# Patient Record
Sex: MALE | NOT HISPANIC OR LATINO | Employment: PART TIME | URBAN - METROPOLITAN AREA
[De-identification: names, ages, dates, MRNs, and addresses within clinical notes are randomized per-mention and may not be internally consistent; named-entity substitution may affect disease eponyms.]

---

## 2018-09-18 ENCOUNTER — OFFICE VISIT (OUTPATIENT)
Dept: URGENT CARE | Facility: URGENT CARE | Age: 33
End: 2018-09-18
Payer: COMMERCIAL

## 2018-09-18 ENCOUNTER — RADIANT APPOINTMENT (OUTPATIENT)
Dept: GENERAL RADIOLOGY | Facility: CLINIC | Age: 33
End: 2018-09-18
Attending: NURSE PRACTITIONER
Payer: COMMERCIAL

## 2018-09-18 VITALS
RESPIRATION RATE: 16 BRPM | TEMPERATURE: 98.3 F | SYSTOLIC BLOOD PRESSURE: 119 MMHG | WEIGHT: 242 LBS | HEART RATE: 88 BPM | DIASTOLIC BLOOD PRESSURE: 84 MMHG | OXYGEN SATURATION: 95 %

## 2018-09-18 DIAGNOSIS — J98.01 ACUTE BRONCHOSPASM: ICD-10-CM

## 2018-09-18 DIAGNOSIS — R05.3 CHRONIC COUGH: Primary | ICD-10-CM

## 2018-09-18 PROCEDURE — 99203 OFFICE O/P NEW LOW 30 MIN: CPT | Performed by: NURSE PRACTITIONER

## 2018-09-18 PROCEDURE — 71046 X-RAY EXAM CHEST 2 VIEWS: CPT | Mod: FY

## 2018-09-18 RX ORDER — CODEINE PHOSPHATE AND GUAIFENESIN 10; 100 MG/5ML; MG/5ML
1 SOLUTION ORAL EVERY 4 HOURS PRN
Qty: 200 ML | Refills: 0 | Status: SHIPPED | OUTPATIENT
Start: 2018-09-18 | End: 2018-09-23

## 2018-09-18 RX ORDER — AZITHROMYCIN 250 MG/1
TABLET, FILM COATED ORAL
COMMUNITY
Start: 2018-09-17 | End: 2019-05-01

## 2018-09-18 RX ORDER — ALBUTEROL SULFATE 90 UG/1
AEROSOL, METERED RESPIRATORY (INHALATION)
COMMUNITY
Start: 2018-09-17 | End: 2024-03-28

## 2018-09-18 RX ORDER — PREDNISONE 20 MG/1
20 TABLET ORAL 2 TIMES DAILY
Qty: 10 TABLET | Refills: 0 | Status: SHIPPED | OUTPATIENT
Start: 2018-09-18 | End: 2018-09-23

## 2018-09-18 ASSESSMENT — ENCOUNTER SYMPTOMS
SHORTNESS OF BREATH: 0
FEVER: 0
SORE THROAT: 0
DIAPHORESIS: 0
WHEEZING: 1
COUGH: 1
DIARRHEA: 0
RHINORRHEA: 0
VOMITING: 0
NAUSEA: 0
CHILLS: 0

## 2018-09-18 NOTE — PROGRESS NOTES
SUBJECTIVE:   Lew Hickman is a 32 year old male presenting with a chief complaint of   Chief Complaint   Patient presents with     Cough     Cough x3 weeks. on Zpack, cough syrup and inhaler.        He is a new patient of [facility].    URI Adult    Onset of symptoms was 3 week(s) ago.  Course of illness is worsening.    Severity moderate  Current and Associated symptoms: fever, cough - productive, wheezing and body aches  Treatment measures tried include azithromycin from [facility]. Did not use albuterol that ordered.   Predisposing factors include None.        Review of Systems   Constitutional: Negative for chills, diaphoresis and fever.   HENT: Positive for congestion. Negative for ear pain, rhinorrhea and sore throat.    Respiratory: Positive for cough and wheezing. Negative for shortness of breath.    Gastrointestinal: Negative for diarrhea, nausea and vomiting.   All other systems reviewed and are negative.      No past medical history on file.  No family history on file.  Current Outpatient Prescriptions   Medication Sig Dispense Refill     azithromycin (ZITHROMAX) 250 MG tablet        guaiFENesin-codeine (ROBITUSSIN AC) 100-10 MG/5ML SOLN solution Take 5 mLs by mouth every 4 hours as needed 200 mL 0     predniSONE (DELTASONE) 20 MG tablet Take 1 tablet (20 mg) by mouth 2 times daily for 5 days 10 tablet 0     VENTOLIN  (90 Base) MCG/ACT inhaler        Social History   Substance Use Topics     Smoking status: Former Smoker     Smokeless tobacco: Never Used     Alcohol use Not on file       OBJECTIVE  /84  Pulse 88  Temp 98.3  F (36.8  C) (Oral)  Resp 16  Wt 242 lb (109.8 kg)  SpO2 95%    Physical Exam   Cardiovascular: S1 normal, S2 normal and normal heart sounds.    Pulmonary/Chest: Effort normal. He has wheezes (expiratory).   Neurological: He is alert.   Psychiatric: He has a normal mood and affect. His behavior is normal.       ASSESSMENT:      ICD-10-CM    1. Chronic cough R05 XR  Chest 2 Views     predniSONE (DELTASONE) 20 MG tablet     guaiFENesin-codeine (ROBITUSSIN AC) 100-10 MG/5ML SOLN solution   2. Acute bronchospasm J98.01       PLAN:  Advised to continue the antibiotic  Albuterol every 4 hours for the next 48 hours, then as needed.  I discussed xray results with the patient.  Patient educational/instructional material provided including reasons for follow-up    The patient indicates understanding of these issues and agrees with the plan.            Patient Instructions     Chronic Cough with Uncertain Cause (Adult)    Everyone has had a cough as part of the common cold, flu, or bronchitis. This kind of cough occurs along with an achy feeling, low-grade fever, nasal and sinus congestion, and a scratchy or sore throat. This usually gets better in 2 to 3 weeks. A cough that lasts longer than 3 weeks may be due to other causes.  If your cough does not improve over the next 2 weeks, further testing may be needed. Follow up with your healthcare provider as advised. Cough suppressants may be recommended. Based on your exam today, the exact cause of your cough is not certain. Below are some common causes for persistent cough.  Smoker's cough  Smoker s cough doesn t go away. If you continue to smoke, it only gets worse. The cough is from irritation in the air passages. Talk to your healthcare provider about quitting. Medicines or nicotine-replacement products, like gum or the patch, may make quitting easier.  Postnasal drip  A cough that is worse at night may be due to postnasal drip. Excess mucus in the nose drains from the back of your nose to your throat. This triggers the cough reflex. Postnasal drip may be due to a sinus infection or allergy. Common allergens include dust, tobacco smoke (both inhaled and secondhand smoke), environmental pollutants, pollen, mold, pets, cleaning agents, room deodorizers, and chemical fumes. Over-the-counter antihistamines or decongestants may be helpful  for allergies. A sinus infection may requires antibiotic treatment. See your healthcare provider if symptoms continue.  Medicines  Certain prescribed medicines can cause a chronic cough in some people:    ACE inhibitors for high blood pressure. These include benazepril, captopril, enalapril, fosinopril, lisinopril, quinapril, ramipril, and others.    Beta-blockers for high blood pressure and other conditions. These include propranolol, atenolol, metoprolol, nadolol, and others.  Let your healthcare provider know if you are taking any of these.  Asthma  Cough may be the only sign of mild asthma. You may have tests to find out if asthma is causing your cough. You may also take asthma medicine on a trial basis.  Acid reflux (heartburn, GERD)  The esophagus is the tube that carries food from the mouth to the stomach. A valve at its lower end prevents stomach acids from flowing upward. If this valve does not work properly, acid from the stomach enters the esophagus. This may cause a burning pain in the upper abdomen or lower chest, belching, or cough. Symptoms are often worse when lying flat. Avoid eating or drinking before bedtime. Try using extra pillows to raise your upper body, or place 4-inch blocks under the head of your bed. You may try an over-the-counter antacid or an acid-blocking medicine such as famotidine, cimetidine, ranitidine, esomeprazole, lansoprazole, or omeprazole. Stronger medicines for this condition can be prescribed by your healthcare provider.  Follow-up care  Follow up with your healthcare provider, or as advised, if your cough does not improve. Further testing may be needed.  Note: If an X-ray was taken, a specialist will review it. You will be notified of any new findings that may affect your care.  When to seek medical advice  Call your healthcare provider right away if any of these occur:    Mild wheezing or difficulty breathing    Fever of 100.4 F (38 C) or higher, or as directed by your  healthcare provider    Unexpected weight loss    Coughing up large amounts of colored sputum    Night sweats (sheets and pajamas get soaking wet)  Call 911  Call 911 if any of these occur:    Coughing up blood    Moderate to severe trouble breathing or wheezing  Date Last Reviewed: 9/13/2015 2000-2017 [employer]. [address] [place] [address], [place] [address]. All rights reserved. This information is not intended as a substitute for professional medical care. Always follow your healthcare professional's instructions.

## 2018-09-18 NOTE — MR AVS SNAPSHOT
After Visit Summary   9/18/2018    Lew Hickman    MRN: [id number]           Patient Information     Date Of Birth          1985        Visit Information        Provider Department      9/18/2018 3:50 PM Meka Bach NP [facility]        Today's Diagnoses     Chronic cough    -  1      Care Instructions      Chronic Cough with Uncertain Cause (Adult)    Everyone has had a cough as part of the common cold, flu, or bronchitis. This kind of cough occurs along with an achy feeling, low-grade fever, nasal and sinus congestion, and a scratchy or sore throat. This usually gets better in 2 to 3 weeks. A cough that lasts longer than 3 weeks may be due to other causes.  If your cough does not improve over the next 2 weeks, further testing may be needed. Follow up with your healthcare provider as advised. Cough suppressants may be recommended. Based on your exam today, the exact cause of your cough is not certain. Below are some common causes for persistent cough.  Smoker's cough  Smoker s cough doesn t go away. If you continue to smoke, it only gets worse. The cough is from irritation in the air passages. Talk to your healthcare provider about quitting. Medicines or nicotine-replacement products, like gum or the patch, may make quitting easier.  Postnasal drip  A cough that is worse at night may be due to postnasal drip. Excess mucus in the nose drains from the back of your nose to your throat. This triggers the cough reflex. Postnasal drip may be due to a sinus infection or allergy. Common allergens include dust, tobacco smoke (both inhaled and secondhand smoke), environmental pollutants, pollen, mold, pets, cleaning agents, room deodorizers, and chemical fumes. Over-the-counter antihistamines or decongestants may be helpful for allergies. A sinus infection may requires antibiotic treatment. See your healthcare provider if symptoms continue.  Medicines  Certain prescribed medicines can  cause a chronic cough in some people:    ACE inhibitors for high blood pressure. These include benazepril, captopril, enalapril, fosinopril, lisinopril, quinapril, ramipril, and others.    Beta-blockers for high blood pressure and other conditions. These include propranolol, atenolol, metoprolol, nadolol, and others.  Let your healthcare provider know if you are taking any of these.  Asthma  Cough may be the only sign of mild asthma. You may have tests to find out if asthma is causing your cough. You may also take asthma medicine on a trial basis.  Acid reflux (heartburn, GERD)  The esophagus is the tube that carries food from the mouth to the stomach. A valve at its lower end prevents stomach acids from flowing upward. If this valve does not work properly, acid from the stomach enters the esophagus. This may cause a burning pain in the upper abdomen or lower chest, belching, or cough. Symptoms are often worse when lying flat. Avoid eating or drinking before bedtime. Try using extra pillows to raise your upper body, or place 4-inch blocks under the head of your bed. You may try an over-the-counter antacid or an acid-blocking medicine such as famotidine, cimetidine, ranitidine, esomeprazole, lansoprazole, or omeprazole. Stronger medicines for this condition can be prescribed by your healthcare provider.  Follow-up care  Follow up with your healthcare provider, or as advised, if your cough does not improve. Further testing may be needed.  Note: If an X-ray was taken, a specialist will review it. You will be notified of any new findings that may affect your care.  When to seek medical advice  Call your healthcare provider right away if any of these occur:    Mild wheezing or difficulty breathing    Fever of 100.4 F (38 C) or higher, or as directed by your healthcare provider    Unexpected weight loss    Coughing up large amounts of colored sputum    Night sweats (sheets and pajamas get soaking wet)  Call 911  Call 911  "if any of these occur:    Coughing up blood    Moderate to severe trouble breathing or wheezing  Date Last Reviewed: 2015-2017 [employer]. [address] [place] [address], [place] [address]. All rights reserved. This information is not intended as a substitute for professional medical care. Always follow your healthcare professional's instructions.                Follow-ups after your visit        Who to contact     If you have questions or need follow up information about today's clinic visit or your schedule please contact [facility] directly at [phone].  Normal or non-critical lab and imaging results will be communicated to you by USB [employer], letter or phone within 4 business days after the clinic has received the results. If you do not hear from us within 7 days, please contact the clinic through [employer] or phone. If you have a critical or abnormal lab result, we will notify you by phone as soon as possible.  Submit refill requests through [employer] or call your pharmacy and they will forward the refill request to us. Please allow 3 business days for your refill to be completed.          Additional Information About Your Visit        MyChart Information     [employer] lets you send messages to your doctor, view your test results, renew your prescriptions, schedule appointments and more. To sign up, go to [email or web address] . Click on \"Log in\" on the left side of the screen, which will take you to the Welcome page. Then click on \"Sign up Now\" on the right side of the page.     You will be asked to enter the access code listed below, as well as some personal information. Please follow the directions to create your username and password.     Your access code is: [email or web address]  Expires: 2018  4:29 PM     Your access code will  in 90 days. If you need help or a new code, please call your [facility] or [phone].        Care EveryWhere ID     This is your " Care EveryWhere ID. This could be used by other organizations to access your [place] medical records  [id number]        Your Vitals Were     Pulse Temperature Respirations Pulse Oximetry          88 98.3  F (36.8  C) (Oral) 16 95%         Blood Pressure from Last 3 Encounters:   09/18/18 119/84    Weight from Last 3 Encounters:   09/18/18 242 lb (109.8 kg)              We Performed the Following     XR Chest 2 Views          Today's Medication Changes          These changes are accurate as of 9/18/18  4:29 PM.  If you have any questions, ask your nurse or doctor.               Start taking these medicines.        Dose/Directions    predniSONE 20 MG tablet   Commonly known as:  DELTASONE   Used for:  Chronic cough   Started by:  Meka Bach NP        Dose:  20 mg   Take 1 tablet (20 mg) by mouth 2 times daily for 5 days   Quantity:  10 tablet   Refills:  0            Where to get your medicines      These medications were sent to [facility] [id number] [place] [address] - [address] AT [facility] [address], [facility] [address]     Phone:  [phone]     predniSONE 20 MG tablet                Primary Care Provider Office Phone # Fax #    [facility] [phone]       [address] [facility] [address]        Equal Access to Services     ALICIA ANDERSON AH: Hadii aad ku hadasho Soomaali, waaxda luqadaha, qaybta kaalmada adeegyada, shane cyr. So [facility] [phone].    ATENCIÓN: Si habla español, tiene a lockhart disposición servicios gratuitos de asistencia lingüística. Elena al [phone].    We comply with applicable federal civil rights laws and [place] laws. We do not discriminate on the basis of race, color, national origin, age, disability, sex, sexual orientation, or gender identity.            Thank you!     Thank you for choosing [facility]  for your care. Our goal is always to  provide you with excellent care. Hearing back from our patients is one way we can continue to improve our services. Please take a few minutes to complete the written survey that you may receive in the mail after your visit with us. Thank you!             Your Updated Medication List - Protect others around you: Learn how to safely use, store and throw away your medicines at www.disposemymeds.org.          This list is accurate as of 9/18/18  4:29 PM.  Always use your most recent med list.                   Brand Name Dispense Instructions for use Diagnosis    azithromycin 250 MG tablet    ZITHROMAX          predniSONE 20 MG tablet    DELTASONE    10 tablet    Take 1 tablet (20 mg) by mouth 2 times daily for 5 days    Chronic cough       VENTOLIN  (90 Base) MCG/ACT inhaler   Generic drug:  albuterol

## 2019-05-01 ENCOUNTER — OFFICE VISIT (OUTPATIENT)
Dept: FAMILY MEDICINE | Facility: CLINIC | Age: 34
End: 2019-05-01
Payer: COMMERCIAL

## 2019-05-01 VITALS
DIASTOLIC BLOOD PRESSURE: 89 MMHG | HEIGHT: 66 IN | BODY MASS INDEX: 40.5 KG/M2 | SYSTOLIC BLOOD PRESSURE: 144 MMHG | OXYGEN SATURATION: 97 % | WEIGHT: 252 LBS | HEART RATE: 75 BPM | TEMPERATURE: 98 F

## 2019-05-01 DIAGNOSIS — E66.01 MORBID OBESITY (H): ICD-10-CM

## 2019-05-01 DIAGNOSIS — R07.9 CHEST PAIN, UNSPECIFIED TYPE: Primary | ICD-10-CM

## 2019-05-01 DIAGNOSIS — R03.0 ELEVATED BLOOD PRESSURE READING WITHOUT DIAGNOSIS OF HYPERTENSION: ICD-10-CM

## 2019-05-01 PROCEDURE — 99214 OFFICE O/P EST MOD 30 MIN: CPT | Performed by: NURSE PRACTITIONER

## 2019-05-01 PROCEDURE — 93000 ELECTROCARDIOGRAM COMPLETE: CPT | Performed by: NURSE PRACTITIONER

## 2019-05-01 ASSESSMENT — PAIN SCALES - GENERAL: PAINLEVEL: SEVERE PAIN (7)

## 2019-05-01 ASSESSMENT — MIFFLIN-ST. JEOR: SCORE: 2028.06

## 2019-05-01 NOTE — PROGRESS NOTES
SUBJECTIVE:   Lew Hickman is a 33 year old male who presents to clinic today for the following   health issues:        CHEST PAIN     Onset: 2 weeks ago    Description:   Location:  left side  Character: intermittent  Radiation: none  Duration: 5-10 minutes     Intensity: mild    Progression of Symptoms:  same    Accompanying Signs & Symptoms:  Shortness of breath: YES, preexisting  Sweating: YES  Nausea/vomiting: no   Lightheadedness: YES  Palpitations: YES  Fever/Chills: no   Cough: YES  Heartburn: YES- sometimes    History:   Family history of heart disease no   Tobacco use: no     Precipitating factors:   Worse with exertion: no   Worse with deep breaths :  YES  Related to food: not sure    Alleviating factors:  None       Therapies Tried and outcome: None        Has heartburn feeling as well as a little sting in left chest  Had cold sweat this morning and stinging feeling.   Sometimes has shortness of breath - worse with walking. Taking deep breath makes it get better  Pain doesn't radiate        Additional history: as documented    Reviewed  and updated as needed this visit by clinical staff  Tobacco  Allergies  Meds  Problems  Med Hx  Surg Hx  Fam Hx  Soc Hx          Reviewed and updated as needed this visit by Provider  Tobacco  Allergies  Meds  Problems  Med Hx  Surg Hx  Fam Hx         Patient Active Problem List   Diagnosis     Morbid obesity (H)     History reviewed. No pertinent surgical history.    Social History     Tobacco Use     Smoking status: Former Smoker     Smokeless tobacco: Never Used   Substance Use Topics     Alcohol use: Not on file     History reviewed. No pertinent family history.      Current Outpatient Medications   Medication Sig Dispense Refill     VENTOLIN  (90 Base) MCG/ACT inhaler        No Known Allergies  No lab results found.   BP Readings from Last 3 Encounters:   05/01/19 144/89   09/18/18 119/84    Wt Readings from Last 3 Encounters:   05/01/19 114.3 kg  "(252 lb)   09/18/18 109.8 kg (242 lb)                  Labs reviewed in EPIC    ROS:  Constitutional, HEENT, cardiovascular, pulmonary, gi and gu systems are negative, except as otherwise noted.    OBJECTIVE:     /89 (BP Location: Right arm, Patient Position: Chair, Cuff Size: Adult Large)   Pulse 75   Temp 98  F (36.7  C) (Oral)   Ht 1.68 m (5' 6.14\")   Wt 114.3 kg (252 lb)   SpO2 97%   BMI 40.50 kg/m    Body mass index is 40.5 kg/m .  GENERAL: healthy, alert and no distress  RESP: lungs clear to auscultation - no rales, rhonchi or wheezes  CV: regular rate and rhythm, normal S1 S2, no S3 or S4, no murmur, click or rub, no peripheral edema and peripheral pulses strong  MS: no gross musculoskeletal defects noted, no edema  PSYCH: mentation appears normal, affect normal/bright    Diagnostic Test Results:  Results for orders placed or performed in visit on 05/01/19 (from the past 24 hour(s))   EKG 12-lead complete w/read - Clinics    Narrative    Sinus rhythm  Rate 67      QTcH 408       ASSESSMENT/PLAN:         ICD-10-CM    1. Chest pain, unspecified type R07.9 EKG 12-lead complete w/read - Clinics   2. Morbid obesity (H) E66.01    3. Elevated blood pressure reading without diagnosis of hypertension R03.0      Recommend evaluation in emergency department now. Declines EMS transport.     CHRISS Murillo [facility]      "

## 2019-05-01 NOTE — PATIENT INSTRUCTIONS
At [facility], we strive to deliver an exceptional experience to you, every time we see you.  If you receive a survey in the mail, please send us back your thoughts. We really do value your feedback.    Based on your medical history, these are the current health maintenance/preventive care services that you are due for (some may have been done at this visit.)  Health Maintenance Due   Topic Date Due     PREVENTIVE CARE VISIT  1985     HIV SCREEN (SYSTEM ASSIGNED)  09/21/2002     DTAP/TDAP/TD IMMUNIZATION (1 - Tdap) 09/21/2009     PHQ-2  01/01/2019         Suggested websites for health information:  Www.[place].org : Up to date and easily searchable information on multiple topics.  Www.medlineplus.gov : medication info, interactive tutorials, watch real surgeries online  Www.familydoctor.org : good info from the Academy of Family Physicians  Www.cdc.gov : public health info, travel advisories, epidemics (H1N1)  Www.aap.org : children's health info, normal development, vaccinations  Www.health.[facility].[place].us : [place] dept of health, public health issues in [place], N1N1    Your care team:                            Family Medicine Internal Medicine   MD Clark Vidal MD Shantel Branch-Fleming, MD Katya Georgiev PA-C Nam Ho, MD Pediatrics   JHON Simpson, RUBIA Lackey APRMD Vivian Ring CNP, MD Deborah Mielke, MD Kim Thein, APRN [facility]      Clinic hours: Monday - Thursday 7 am-7 pm; Fridays 7 am-5 pm.   Urgent care: Monday - Friday 11 am-9 pm; Saturday and Sunday 9 am-5 pm.  Pharmacy : Monday -Thursday 8 am-8 pm; Friday 8 am-6 pm; Saturday and Sunday 9 am-5 pm.     Clinic: [phone]   Pharmacy: [phone]

## 2022-10-14 ENCOUNTER — HOSPITAL ENCOUNTER (EMERGENCY)
Facility: HOSPITAL | Age: 37
Discharge: HOME | End: 2022-10-14
Admitting: STUDENT IN AN ORGANIZED HEALTH CARE EDUCATION/TRAINING PROGRAM
Payer: COMMERCIAL

## 2022-10-14 VITALS
OXYGEN SATURATION: 96 % | SYSTOLIC BLOOD PRESSURE: 144 MMHG | WEIGHT: 250 LBS | HEART RATE: 69 BPM | RESPIRATION RATE: 18 BRPM | BODY MASS INDEX: 40.18 KG/M2 | HEIGHT: 66 IN | DIASTOLIC BLOOD PRESSURE: 85 MMHG | TEMPERATURE: 97.8 F

## 2022-10-14 DIAGNOSIS — S05.01XA CONJUNCTIVAL ABRASION, RIGHT, INITIAL ENCOUNTER: ICD-10-CM

## 2022-10-14 DIAGNOSIS — H57.11 ACUTE RIGHT EYE PAIN: ICD-10-CM

## 2022-10-14 PROCEDURE — 99283 EMERGENCY DEPT VISIT LOW MDM: CPT

## 2022-10-14 PROCEDURE — 250N000009 HC RX 250: Performed by: EMERGENCY MEDICINE

## 2022-10-14 RX ORDER — TETRACAINE HYDROCHLORIDE 5 MG/ML
1 SOLUTION OPHTHALMIC ONCE
Status: COMPLETED | OUTPATIENT
Start: 2022-10-14 | End: 2022-10-14

## 2022-10-14 RX ORDER — ERYTHROMYCIN 5 MG/G
0.5 OINTMENT OPHTHALMIC 4 TIMES DAILY
Qty: 10 G | Refills: 0 | Status: SHIPPED | OUTPATIENT
Start: 2022-10-14 | End: 2022-10-19

## 2022-10-14 RX ADMIN — TETRACAINE HYDROCHLORIDE 1 DROP: 5 SOLUTION OPHTHALMIC at 15:26

## 2022-10-14 RX ADMIN — FLUORESCEIN SODIUM 1 STRIP: 1 STRIP OPHTHALMIC at 15:26

## 2022-10-14 ASSESSMENT — ENCOUNTER SYMPTOMS
EYE PAIN: 1
EYE REDNESS: 1
NAUSEA: 0
HEADACHES: 0
VOMITING: 0

## 2022-10-14 ASSESSMENT — VISUAL ACUITY
OD: 20/20
OS: 20/15

## 2022-10-14 NOTE — Clinical Note
Lew Hickman was seen and treated in our emergency department on 10/14/2022.  He may return to work on 10/17/2022.       If you have any questions or concerns, please don't hesitate to call.      Anabel Pugh PA-C

## 2022-10-14 NOTE — ED NOTES
ED Provider In Triage Note  [facility]  Encounter Date: Oct 14, 2022    No chief complaint on file.      Brief HPI:   Lew Hickman is a 37 year old male presenting to the Emergency Department with a chief complaint of right eye injury that occurred at work today. A plastic box strap hit him in the eye. Occasional blurring, but no vision changes otherwise. Has glasses, but does not wear them; no contact lenses.     Brief Physical Exam  There were no vitals taken for this visit.  General: Non-toxic appearing  HEENT: Atraumatic  EYES: right eye with conjunctival injection (especially medially); PERRL; EOMI  Resp: No respiratory distress  Abdomen: Non-peritoneal  Neuro: Alert, oriented, answers questions appropriately  Psych: Behavior appropriate      Plan Initiated in Triage:  Visual acuity    Will need slit lamp exam, fluorescein staining      PIT Dispo:   Return to lobby while awaiting workup and ED bed availability    Clara Francois MD on 10/14/2022 at 12:44 PM    Patient was evaluated by the Physician in Triage due to a limitation of available rooms in the Emergency Department. A plan of care was discussed based on the information obtained on the initial evaluation and patient was consuled to return back to the Emergency Department lobby after this initial evalutaiton until results were obtained or a room became available in the Emergency Department. Patient was counseled not to leave prior to receiving the results of their workup.     Clara Francois MD  [facility] EMERGENCY DEPARTMENT  [address] [place] [id number]  [phone]       Clara Francois MD  10/14/22 1246

## 2022-10-14 NOTE — ED PROVIDER NOTES
EMERGENCY DEPARTMENT ENCOUNTER      NAME: Lew Hickman  AGE: 37 year old male  YOB: 1985  MRN: [id number]  EVALUATION DATE & TIME: No admission date for patient encounter.    PCP: [facility]    ED PROVIDER: Anabel Pugh PA-C      Chief Complaint   Patient presents with     Eye Injury       FINAL IMPRESSION:  1. Acute right eye pain    2. Conjunctival abrasion, right, initial encounter        MEDICAL DECISION MAKING:    Pertinent Labs & Imaging studies reviewed. (See chart for details)  Lew Hickman is a 37 year old male who presents for evaluation of right eye pain following an injury at work.  Patient reports about 1 hour prior to evaluation, was opening packages when a part of the plastic wrapping hit him in the right eye.  He developed pain and tearing following this immediately.  Patient does not wear contacts.  Patient declines any decreased vision..     On my initial evaluation, vital signs normal. On physical exam patient is awake, alert, slightly uncomfortable appearing, no acute distress.  His right eye is slightly injected with some watery discharge.  No obvious foreign body appreciated on initial inspection.  No obvious deformity or swelling.  No skin changes or laceration surrounding right eye.  Left eye normal.  Visual acuity normal.    On inspection of right eye with slit lamp examination after fluorescein staining.  He does have one small epithelial defect with fluorescein uptake to the  conjunctiva nasally at the 3 o'clock position.  No corneal abrasion or injury noted.  No obvious foreign body.    Differential diagnosis includes corneal abrasion, corneal ulcer, conjunctival injury, foreign body, conjunctivitis. Emergency department workup included slit-lamp examination with tetracaine drops and fluorescein staining.     Based on slit-lamp examination as detailed above, suspect this is a epithelial defect in the conjunctiva.  No obvious  foreign body appreciated.  No corneal injury.  Defect is very small, but will treat with erythromycin ointment 4 times a day for 5 days and did provide patient with [place] [facility] referral so that he can be seen by an ophthalmologist emergently either this afternoon or tomorrow for further investigation of eye injury.  Reassuring that patient visual acuity normal.  Provided strict return precautions to the emergency department if vision changes or if pain does not improve or gets worse prior to ophthalmology visit.    Patient was discharged in stable condition with treatment plan as below. Instructed to follow up with ophthalmology in 1 day and with primary care provider in 3 days and return to the emergency department with any new or worsening of symptoms. Patient expressed understanding, feels comfortable, and is in agreement with this plan. All questions addressed prior to discharge.    Medical Decision Making    Supplemental history from: N/A    External Record(s) Reviewed: N/A    Differential Diagnosis: See MDM charting for differential considered.     I performed an independent interpretation of the: N/A    Discussed with radiology regarding test interpretation: N/A    Discussion of management with another provider: N/A    The following testing was considered but ultimately not selected: None     I considered prescription management with: Antibiotic    The patient's care impacted: None    Consideration of Admission/Observation: N/A - Patient admitted or discharged without consideration for admission    Care significantly affected by Social Determinants of Health including: N/A    ED COURSE:  2:14 PM  I reviewed the patient's chart. I met with the patient to gather history and to perform my initial exam.    I wore appropriate PPE during this encounter including: facemask & eye protection   2:45 PM I rechecked the patient and updated  on results. We discussed plan for discharge including treatment plan,  "follow-up and return precautions to emergency department.  Patient voiced understanding and in agreement with this plan.    At the conclusion of the encounter I discussed the results of all of the tests and the disposition. The questions were answered. The patient or family acknowledged understanding and was agreeable with the care plan.     MEDICATIONS GIVEN IN THE EMERGENCY:  Medications   tetracaine (PONTOCAINE) 0.5 % ophthalmic solution 1 drop (1 drop Right Eye Given by Other 10/14/22 1526)   fluorescein (FUL-CALVIN) ophthalmic strip 1 strip (1 strip Right Eye Given by Other 10/14/22 1526)       NEW PRESCRIPTIONS STARTED AT TODAY'S ER VISIT  Discharge Medication List as of 10/14/2022  3:15 PM      START taking these medications    Details   erythromycin (ROMYCIN) 5 MG/GM ophthalmic ointment Place 0.5 inches into the right eye 4 times daily for 5 daysDisp-10 g, R-0Local Print                  =================================================================    HPI:    Patient information was obtained from: Patient    Use of Interpretor: N/A       Lew Vang Her is a 37 year old male with a pertinent history of morbid obesity who presents to this ED by walk in for evaluation of eye injury.    Patient reports that he was picking up \"plastic box scraps\" when one of the scraps whipped up and hit him in the right eye. Patient complains of a stinging pain and redness in the right eye. Patient denies wearing contact lenses, vision changes, headache, nausea, vomiting, or any other concerns at this time.    REVIEW OF SYSTEMS:  Review of Systems   Eyes: Positive for pain and redness. Negative for visual disturbance.   Gastrointestinal: Negative for nausea and vomiting.   Neurological: Negative for headaches.   All other systems reviewed and are negative.      PAST MEDICAL HISTORY:  No past medical history on file.    PAST SURGICAL HISTORY:  No past surgical history on file.    CURRENT MEDICATIONS:    No current " "facility-administered medications for this encounter.    Current Outpatient Medications:      erythromycin (ROMYCIN) 5 MG/GM ophthalmic ointment, Place 0.5 inches into the right eye 4 times daily for 5 days, Disp: 10 g, Rfl: 0     VENTOLIN  (90 Base) MCG/ACT inhaler, , Disp: , Rfl:     ALLERGIES:  No Known Allergies    FAMILY HISTORY:  No family history on file.    SOCIAL HISTORY:   Social History     Socioeconomic History     Marital status: Single   Tobacco Use     Smoking status: Former     Smokeless tobacco: Never       VITALS:  Patient Vitals for the past 24 hrs:   BP Temp Temp src Pulse Resp SpO2 Height Weight   10/14/22 1536 (!) 144/85 -- -- 69 18 96 % -- --   10/14/22 1245 (!) 142/94 97.8  F (36.6  C) Temporal 72 16 96 % 1.676 m (5' 6\") 113.4 kg (250 lb)       PHYSICAL EXAM   Constitutional: Well developed, Well nourished, NAD  HENT: Normocephalic, Atraumatic, Bilateral external ears normal, Oropharynx normal, mucous membranes moist, Nose normal.   Neck: Normal range of motion, No tenderness, Supple, No stridor.  Eyes: PERRL, EOMI, slightly injected conjunctiva with some watery discharge.  No obvious foreign body appreciated on initial inspection.  No obvious deformity or swelling.  No skin changes or laceration surrounding right eye.  Left eye normal.  Visual acuity normal.  Respiratory: Normal breath sounds, No respiratory distress, No wheezing, Speaks full sentences easily. No cough.  Cardiovascular: Normal heart rate, Regular rhythm, No murmurs, No rubs, No gallops. Chest wall nontender.  GI: Soft, No tenderness, No masses, No flank tenderness. No rebound or guarding.  Musculoskeletal: 2+ DP pulses. No edema. No cyanosis, No clubbing. Good range of motion in all major joints. No tenderness to palpation or major deformities noted. No tenderness of the CTLS spine.   Integument: Warm, Dry, No erythema, No rash. No petechiae.  Neurologic: Alert & oriented x 3, Normal motor function, Normal sensory " function, No focal deficits noted. Normal gait.  Psychiatric: Affect normal, Judgment normal, Mood normal. Cooperative.  Slit lamp exam: slit lamp examination after fluorescein staining.  He does have some epithelial defects with fluorescein uptake to the  conjunctiva nasally at the 3 o'clock position.  No corneal abrasion or injury noted.  No obvious foreign body.      LAB:  All pertinent labs reviewed and interpreted.  Labs Ordered and Resulted from Time of ED Arrival to Time of ED Departure - No data to display    RADIOLOGY:  Reviewed all pertinent imaging. Please see official radiology report.  No orders to display       PROCEDURES:   PROCEDURE: SLIT LAMP   INDICATIONS: Right eye pain   PROCEDURE PROVIDER: Anabel Pugh PA-C   SITE: Right eye   MEDICATION: tetracaine was applied by this provider to right eye.     NOTE:  right eye exam:  One small epithelial defect with fluorescein uptake to the  conjunctiva nasally at the 3 o'clock position.  No corneal abrasion or injury noted.  No obvious foreign body.     COMPLICATIONS:  None     Diagnosis:  1. Acute right eye pain    2. Conjunctival abrasion, right, initial encounter            IKwabena, am serving as a scribe to document services personally performed by Anabel Pugh PA-C based on my observation and the provider's statements to me. I, Anabel Pugh PA-C attest that Kwabena Jose is acting in a scribe capacity, has observed my performance of the services and has documented them in accordance with my direction.    Anabel Pugh PA-C  Emergency Medicine  [facility]  10/14/2022       Anabel Pugh PA-C  10/14/22 1752

## 2022-10-14 NOTE — ED TRIAGE NOTES
Patient presents to ED with Right Eye injury that occurred at work with 45 minutes ago.  He states that a piece of plastic wrap flew up and hit his right eye.  Note redness in conjunctiva, slight blurry vision.  Wears contacts, but not today.     Triage Assessment     Row Name 10/14/22 1246       Triage Assessment (Adult)    Airway WDL WDL       Respiratory WDL    Respiratory WDL WDL       Skin Circulation/Temperature WDL    Skin Circulation/Temperature WDL WDL       Cardiac WDL    Cardiac WDL WDL       Peripheral/Neurovascular WDL    Peripheral Neurovascular WDL WDL       Cognitive/Neuro/Behavioral WDL    Cognitive/Neuro/Behavioral WDL WDL

## 2022-10-14 NOTE — DISCHARGE INSTRUCTIONS
Please bring this paperwork with you to your follow-up appointment.    You were seen in the urgent care/emergency department for right eye pain after an injury.     You had an exam done today with fluroscein staining that showed a superficial cut on the conjunctiva (white part) of your eye.     This should heal over the next few days with the antibiotic ointment.     Please use erythromycin ointment to the eye 4 x a day for 5 days.     Please see an ophthalmologist tomorrow morning    For your symptoms:    Tylenol/ibuprofen as needed  You may take up to 650 mg of Tylenol (acetaminophen) up to 4 times daily and up to 600 mg of ibuprofen up to 4 times daily as needed for fever, pain.  Please do not take more than the daily maximum recommended dose (tylenol = 4 grams, ibuprofen = 2.4 grams) as it can cause harm to your liver, kidneys, stomach.  It is best to take ibuprofen with food. Please read labels of any over-the-counter medicine you may be taking as it may contain Tylenol (acetaminophen) or Advil (ibuprofen).     Please follow up with ophthalmology tomorrow morning at the address listed above.     Follow up with your primary care provider for recheck in 3 days for ER follow up.     Return to the emergency department if you develop worsening eye pain, decreased vision, blurry vision, fevers, chills , or any other new worsening or concerning symptoms. We'd be happy to see you again.    Thank you for allowing us to be part of your care today.    Take care!  -Anabel Pugh PA-C

## 2024-03-27 ENCOUNTER — NURSE TRIAGE (OUTPATIENT)
Dept: FAMILY MEDICINE | Facility: CLINIC | Age: 39
End: 2024-03-27
Payer: COMMERCIAL

## 2024-03-27 NOTE — TELEPHONE ENCOUNTER
Patient calling in with 3 weeks of productive cough (green/yellow sputum), mild SOB with exertion (not at rest) and wheezing when laying down. Also experiencing runny nose, mild HA, sinus pain, sore throat.     Patient requesting appt to be seen.     Patient states he has not taken at home COVID test, has been around family who had similar symptoms, specifically mom who he thinks ended up being dx with PNA.     Recommended patient head to nearest  now to be assessed, another option for care would be nearest ED if needed (if SOB worsens or starts having CP). Also recommended that patient call office back after being seen to establish care if he wants [facility] to be PCP clinic. Patient verbalized understanding, agrees with plan.      TOMAS Soares, RN  [facility]    Reason for Disposition   MILD difficulty breathing (e.g., minimal/no SOB at rest, SOB with walking, pulse <100) and still present when not coughing    Additional Information   Negative: Bluish (or gray) lips or face   Negative: SEVERE difficulty breathing (e.g., struggling for each breath, speaks in single words)   Negative: Rapid onset of cough and has hives   Negative: Coughing started suddenly after medicine, an allergic food or bee sting   Negative: Difficulty breathing after exposure to flames, smoke, or fumes   Negative: Sounds like a life-threatening emergency to the triager   Negative: Previous asthma attacks and this feels like asthma attack   Negative: Dry cough (non-productive; no sputum or minimal clear sputum) and within 14 days of COVID-19 Exposure   Negative: MODERATE difficulty breathing (e.g., speaks in phrases, SOB even at rest, pulse 100-120) and still present when not coughing   Negative: Chest pain present when not coughing   Negative: Passed out (i.e., fainted, collapsed and was not responding)   Negative: Patient sounds very sick or weak to the triager    Answer Assessment - Initial Assessment  "Questions  1. ONSET: \"When did the cough begin?\"       3 weeks  2. SEVERITY: \"How bad is the cough today?\"       Mild/moderate (every 15-30 minutes), worse with laying down  3. SPUTUM: \"Describe the color of your sputum\" (none, dry cough; clear, white, yellow, green)      Sputum - green/yellow  4. HEMOPTYSIS: \"Are you coughing up any blood?\" If so ask: \"How much?\" (flecks, streaks, tablespoons, etc.)      Sometimes a \"little pink sometimes\" - not sure if this was due to a medication he was taking (dayquil/nyquil), last time he noticed the pink was 2 days ago  5. DIFFICULTY BREATHING: \"Are you having difficulty breathing?\" If Yes, ask: \"How bad is it?\" (e.g., mild, moderate, severe)     - MILD: No SOB at rest, mild SOB with walking, speaks normally in sentences, can lie down, no retractions, pulse < 100.     - MODERATE: SOB at rest, SOB with minimal exertion and prefers to sit, cannot lie down flat, speaks in phrases, mild retractions, audible wheezing, pulse 100-120.     - SEVERE: Very SOB at rest, speaks in single words, struggling to breathe, sitting hunched forward, retractions, pulse > 120       Moderate, wheezing with laying down, mild SOB with walking/carrying heavy things  6. FEVER: \"Do you have a fever?\" If Yes, ask: \"What is your temperature, how was it measured, and when did it start?\"      No  7. CARDIAC HISTORY: \"Do you have any history of heart disease?\" (e.g., heart attack, congestive heart failure)       No  8. LUNG HISTORY: \"Do you have any history of lung disease?\"  (e.g., pulmonary embolus, asthma, emphysema)      No  9. PE RISK FACTORS: \"Do you have a history of blood clots?\" (or: recent major surgery, recent prolonged travel, bedridden)      No  10. OTHER SYMPTOMS: \"Do you have any other symptoms?\" (e.g., runny nose, wheezing, chest pain)        Wheezing, runny nose, HA, sinus pain, sore throat  11. PREGNANCY: \"Is there any chance you are pregnant?\" \"When was your last menstrual period?\"        " "N/A  12. TRAVEL: \"Have you traveled out of the country in the last month?\" (e.g., travel history, exposures)        Mom was sick - had same symptoms but worse, thinking it was PNA (on nebulizers, had asthma)    Protocols used: Cough-A-OH    "

## 2024-03-28 ENCOUNTER — OFFICE VISIT (OUTPATIENT)
Dept: URGENT CARE | Facility: URGENT CARE | Age: 39
End: 2024-03-28
Payer: COMMERCIAL

## 2024-03-28 VITALS
OXYGEN SATURATION: 95 % | TEMPERATURE: 98.9 F | BODY MASS INDEX: 44.06 KG/M2 | WEIGHT: 273 LBS | HEART RATE: 79 BPM | DIASTOLIC BLOOD PRESSURE: 91 MMHG | SYSTOLIC BLOOD PRESSURE: 132 MMHG

## 2024-03-28 DIAGNOSIS — J20.9 ACUTE BRONCHITIS, UNSPECIFIED ORGANISM: Primary | ICD-10-CM

## 2024-03-28 DIAGNOSIS — G43.009 NONINTRACTABLE MIGRAINE, UNSPECIFIED MIGRAINE TYPE: ICD-10-CM

## 2024-03-28 DIAGNOSIS — R03.0 ELEVATED BLOOD PRESSURE READING WITHOUT DIAGNOSIS OF HYPERTENSION: ICD-10-CM

## 2024-03-28 DIAGNOSIS — R06.83 SNORING: ICD-10-CM

## 2024-03-28 PROCEDURE — 99214 OFFICE O/P EST MOD 30 MIN: CPT | Performed by: STUDENT IN AN ORGANIZED HEALTH CARE EDUCATION/TRAINING PROGRAM

## 2024-03-28 RX ORDER — GUAIFENESIN 600 MG/1
1200 TABLET, EXTENDED RELEASE ORAL 2 TIMES DAILY
Qty: 28 TABLET | Refills: 0 | Status: SHIPPED | OUTPATIENT
Start: 2024-03-28 | End: 2024-04-04

## 2024-03-28 RX ORDER — SUMATRIPTAN 25 MG/1
25 TABLET, FILM COATED ORAL
Qty: 10 TABLET | Refills: 0 | Status: SHIPPED | OUTPATIENT
Start: 2024-03-28 | End: 2024-04-02

## 2024-03-28 RX ORDER — BENZONATATE 200 MG/1
200 CAPSULE ORAL 2 TIMES DAILY PRN
Qty: 14 CAPSULE | Refills: 0 | Status: SHIPPED | OUTPATIENT
Start: 2024-03-28 | End: 2024-04-04

## 2024-03-28 NOTE — LETTER
March 28, 2024      Almavandanamildred Vang Her  [address]  [place] [address]        To Whom It May Concern:    Almavandanamildred Vang Her  was seen on 3/28 in urgent care.  Please excuse him from work today due to acute illness.        Sincerely,        Puja Hodgson, DO

## 2024-03-28 NOTE — PROGRESS NOTES
Assessment & Plan     Acute bronchitis, unspecified organism  - benzonatate (TESSALON) 200 MG capsule  Dispense: 14 capsule; Refill: 0  - guaiFENesin (MUCINEX) 600 MG 12 hr tablet  Dispense: 28 tablet; Refill: 0    Exam reassuring against influenza, bacterial pneumonia.  No red flag symptoms.  Most likely acute bronchitis so discussed symptom management including using a humidifier, Cepacol lozenges, Tylenol, ibuprofen, Mucinex, Tessalon and return precautions provided. Lew's questions were answered and he was understanding of the plan    Snoring  - Adult Sleep Eval & Management  Referral    STOP-BANG=6. Likely has underlying sleep apnea contributing to cough.    Nonintractable migraine, unspecified migraine type  - SUMAtriptan (IMITREX) 25 MG tablet  Dispense: 10 tablet; Refill: 0    Has a history of migraines with no chronic medication management.  No red flag symptoms will prescribe short course of Imitrex as current cough 2/2 bronchitis is likely making his headaches worse.  He has no contraindications to triptans.    Elevated blood-pressure reading without diagnosis of hypertension  Possibly related to undiagnosed ADOLFO and elevated BMI.  Today, we discussed establishing with primary care physician for repeat check and possible medication management.    Return for if symptoms do not improve in 10 days.    Puja Hodgson, DO  she/her  [facility]    Subjective     Lew Vang Her is a 38 year old male who presents to clinic today for the following health issues:    HPI    3 weeks of productive cough (YELLOW/GREEN)  Worse when he lays down  Coughs so much that his stomach hurts  Has taken Nyquil and Tylenol that works sometimes  Thinks he has some wheezing  Mom was in the hospital from this the cough but thinks it was due to her underlying asthma  No fever, night sweats  Chills when it first started but that has   No nausea, vomiting, diarrhea, dysuria    Headache off and on for 3  weeks  Mostly on the left side  Thinks he is coughing so much that it's causing a headache  No change in vision, no vomiting  Has a h/o migraines, no current abortive     STOP Bang 6 points    No past medical history on file.    No Known Allergies  Current Outpatient Medications   Medication    benzonatate (TESSALON) 200 MG capsule    guaiFENesin (MUCINEX) 600 MG 12 hr tablet    SUMAtriptan (IMITREX) 25 MG tablet     No current facility-administered medications for this visit.      Review of Systems  Constitutional, HEENT, cardiovascular, pulmonary, gi and gu systems are negative, except as otherwise noted.      Objective    BP (!) 132/91 (BP Location: Left arm, Patient Position: Sitting, Cuff Size: Adult Large)   Pulse 79   Temp 98.9  F (37.2  C) (Tympanic)   Wt 123.8 kg (273 lb)   SpO2 95%   BMI 44.06 kg/m      Physical Exam  Constitutional:       Appearance: He is obese. He is not ill-appearing.   HENT:      Right Ear: No tenderness. A middle ear effusion is present. Tympanic membrane is not perforated or erythematous.      Left Ear: No tenderness. A middle ear effusion is present. Tympanic membrane is not perforated or erythematous.      Nose: Rhinorrhea present. Rhinorrhea is clear.      Mouth/Throat:      Lips: Pink.      Pharynx: Oropharynx is clear. Uvula midline. No posterior oropharyngeal erythema.      Tonsils: No tonsillar exudate.   Eyes:      Conjunctiva/sclera:      Right eye: Right conjunctiva is injected.      Left eye: Left conjunctiva is injected.   Cardiovascular:      Rate and Rhythm: Normal rate and regular rhythm.   Pulmonary:      Effort: Pulmonary effort is normal. No respiratory distress.      Breath sounds: Transmitted upper airway sounds present. No decreased air movement. No wheezing, rhonchi or rales.   Neurological:      Mental Status: He is alert.              The use of Dragon/MEDOVENT dictation services may have been used to construct the content in this note; any grammatical  or spelling errors are non-intentional. Please contact the author of this note directly if you are in need of any clarification.

## 2024-03-28 NOTE — PATIENT INSTRUCTIONS
Bronchitis is inflammation of the bronchial tubes, which carry air to the lungs. The tubes swell and produce mucus, or phlegm. The mucus and inflamed bronchial tubes make you cough. You may have trouble breathing.    Most cases of bronchitis are caused by viruses like those that cause colds. Antibiotics most often do not help and they may cause harm.    Bronchitis usually develops rapidly and lasts about 2 to 3 weeks in people who are otherwise healthy.      How can you care for yourself at home?  Get some extra rest.    Take an over-the-counter pain medicine, such as acetaminophen (Tylenol), ibuprofen (Advil, Motrin), or naproxen (Aleve) to reduce fever and relieve body aches. Read and follow all instructions on the label.    Take an over-the-counter cough medicine to help quiet a dry, hacking cough so that you can sleep. Avoid cough medicines that have more than one active ingredient. Read and follow all instructions on the label.    Do not smoke. Smoking can make bronchitis worse. If you need help quitting, talk to your doctor about stop-smoking programs and medicines. These can increase your chances of quitting for good.      Coricidin is a decongestant medicine for people with high blood pressure    Dextromethorphan  (OTC Robitussin) Immediate release (liquids, lozenges, and capsules): 10 to 20 mg orally every 4 hours or 20 to 30 mg every 6 to 8 hours as needed  Extended release (suspension): 60 mg orally twice daily as needed (maximum daily dose: 120 mg) Cough suppressant   Guaifenesin  (OTC Mucinex) Immediate release (liquids and tablets): 200 to 400 mg orally every 4 hours as needed  Extended release (tablets): 600 mg to 1.2 g orally every 12 hours as needed  (maximum daily dose: 2.4 g) Helps loosen mucus/bronchial secretions.        Benzonatate 100 to 200 mg orally 3 times per day as needed  (maximum daily dose: 600 mg) Cough suppressant    Swallow capsule whole; do not chew or break   Non-pharmacologic  interventions   Throat lozenges (oral) As needed (per labelling instructions) Lozenges (typically nonmedicated or with menthol) may relieve sore throat and reduce cough frequency and severity   Honey (oral) As needed Often taken in hot water or tea  May reduce cough frequency and severity   Smoking cessation (and avoidance of second-hand smoke) -- Smoke is an airway irritant

## 2024-09-13 ENCOUNTER — OFFICE VISIT (OUTPATIENT)
Dept: URGENT CARE | Facility: URGENT CARE | Age: 39
End: 2024-09-13
Payer: COMMERCIAL

## 2024-09-13 VITALS
TEMPERATURE: 97.5 F | OXYGEN SATURATION: 96 % | RESPIRATION RATE: 18 BRPM | SYSTOLIC BLOOD PRESSURE: 132 MMHG | WEIGHT: 276 LBS | BODY MASS INDEX: 44.55 KG/M2 | DIASTOLIC BLOOD PRESSURE: 82 MMHG | HEART RATE: 91 BPM

## 2024-09-13 DIAGNOSIS — L50.9 HIVES: Primary | ICD-10-CM

## 2024-09-13 DIAGNOSIS — J30.81 ALLERGY TO DOGS: ICD-10-CM

## 2024-09-13 PROCEDURE — 99213 OFFICE O/P EST LOW 20 MIN: CPT | Performed by: PHYSICIAN ASSISTANT

## 2024-09-13 RX ORDER — PREDNISONE 20 MG/1
40 TABLET ORAL DAILY
Qty: 12 TABLET | Refills: 0 | Status: SHIPPED | OUTPATIENT
Start: 2024-09-13 | End: 2024-09-19

## 2024-09-13 NOTE — PROGRESS NOTES
Chief Complaint   Patient presents with    Urgent Care    Hives     Per patient states since Sunday he has been hives believes it maybe due to being around his cousins dogs, states on Saturday he went to his cousins house to celebrate his birthday and his cousins dogs kept coming to him to get pet, since Sunday he has been taking benadryl 50mg Sunday thru Tuesday and then started  1 allegra 24hrs also using hydrocortisone and benadryl but not helping                ASSESSMENT:     ICD-10-CM    1. Hives  L50.9 predniSONE (DELTASONE) 20 MG tablet      2. Allergy to dogs  J30.81             PLAN: Continue Allegra and/or Benadryl.  Oral prednisone.  I have discussed clinical findings with patient.  Side effects of medications discussed.  Symptomatic care is discussed.  I have discussed the possibility of  worsening symptoms and indication to RTC or go to the ER if they occur.  All questions are answered, patient indicates understanding of these issues and is in agreement with plan.   Patient care instructions are discussed/given at the end of visit.       Ann Rich PA-C      SUBJECTIVE:  38-year-old male with known dog allergy was petting his relatives dog 6 days ago.  The day after developed hive like rash.  Has been using Benadryl and Allegra.  Also topical hydrocortisone.  Resolved but then came back yesterday.  Now today gone again.  No tongue, lip, throat swelling, difficulty breathing.  Rash was mainly on his trunk and arms.      No Known Allergies    No past medical history on file.    No current outpatient medications on file.     No current facility-administered medications for this visit.       Social History     Tobacco Use    Smoking status: Former    Smokeless tobacco: Never   Substance Use Topics    Alcohol use: Yes     Comment: SPECIAL OCC       ROS:  CONSTITUTIONAL: Negative for fatigue or fever.  EYES: Negative for eye problems.  ENT: As above.  RESP: As above.  CV: Negative for chest  pains.  GI: Negative for vomiting.  MUSCULOSKELETAL:  Negative for significant muscle or joint pains.  NEUROLOGIC: Negative for headaches.  SKIN: as above.  PSYCH: Normal mentation for age.    OBJECTIVE:  /82   Pulse 91   Temp 97.5  F (36.4  C) (Tympanic)   Resp 18   Wt 125.2 kg (276 lb)   SpO2 96%   BMI 44.55 kg/m    GENERAL APPEARANCE: Healthy, alert and no distress.  EYES:Conjunctiva/sclera clear.  NECK: Moving head neck freely .  RESP: Breathing comfortably .  NEURO: Awake, alert    SKIN: No rashes currently.  Did show me a picture from yesterday.  Multiple raised white welts on his arms.      Ann Rich PA-C